# Patient Record
Sex: FEMALE | URBAN - METROPOLITAN AREA
[De-identification: names, ages, dates, MRNs, and addresses within clinical notes are randomized per-mention and may not be internally consistent; named-entity substitution may affect disease eponyms.]

---

## 2024-04-08 ENCOUNTER — OFFICE VISIT (OUTPATIENT)
Dept: INTERNAL MEDICINE CLINIC | Age: 29
End: 2024-04-08
Payer: COMMERCIAL

## 2024-04-08 VITALS
BODY MASS INDEX: 38.99 KG/M2 | HEIGHT: 65 IN | SYSTOLIC BLOOD PRESSURE: 130 MMHG | OXYGEN SATURATION: 98 % | RESPIRATION RATE: 17 BRPM | WEIGHT: 234 LBS | DIASTOLIC BLOOD PRESSURE: 88 MMHG | HEART RATE: 95 BPM | TEMPERATURE: 97.2 F

## 2024-04-08 DIAGNOSIS — F41.9 ANXIETY AND DEPRESSION: ICD-10-CM

## 2024-04-08 DIAGNOSIS — J45.20 MILD INTERMITTENT ASTHMA WITHOUT COMPLICATION: Primary | ICD-10-CM

## 2024-04-08 DIAGNOSIS — F32.A ANXIETY AND DEPRESSION: ICD-10-CM

## 2024-04-08 DIAGNOSIS — Z83.3 FAMILY HISTORY OF DIABETES MELLITUS: ICD-10-CM

## 2024-04-08 DIAGNOSIS — R73.9 HYPERGLYCEMIA: ICD-10-CM

## 2024-04-08 DIAGNOSIS — F50.81 BINGE EATING DISORDER: ICD-10-CM

## 2024-04-08 DIAGNOSIS — R63.8 INCREASED BMI: ICD-10-CM

## 2024-04-08 PROBLEM — F50.819 BINGE EATING DISORDER: Status: ACTIVE | Noted: 2024-04-08

## 2024-04-08 PROCEDURE — 99204 OFFICE O/P NEW MOD 45 MIN: CPT | Performed by: FAMILY MEDICINE

## 2024-04-08 RX ORDER — ALBUTEROL SULFATE 90 UG/1
2 AEROSOL, METERED RESPIRATORY (INHALATION) EVERY 6 HOURS PRN
COMMUNITY
End: 2024-04-08 | Stop reason: SDUPTHER

## 2024-04-08 RX ORDER — ALBUTEROL SULFATE 90 UG/1
2 AEROSOL, METERED RESPIRATORY (INHALATION) EVERY 6 HOURS PRN
Qty: 18 G | Refills: 1 | Status: SHIPPED | OUTPATIENT
Start: 2024-04-08

## 2024-04-08 RX ORDER — FLUOXETINE 10 MG/1
10 CAPSULE ORAL DAILY
Qty: 90 CAPSULE | Refills: 0 | Status: SHIPPED | OUTPATIENT
Start: 2024-04-08

## 2024-04-08 SDOH — ECONOMIC STABILITY: FOOD INSECURITY: WITHIN THE PAST 12 MONTHS, THE FOOD YOU BOUGHT JUST DIDN'T LAST AND YOU DIDN'T HAVE MONEY TO GET MORE.: NEVER TRUE

## 2024-04-08 SDOH — ECONOMIC STABILITY: INCOME INSECURITY: HOW HARD IS IT FOR YOU TO PAY FOR THE VERY BASICS LIKE FOOD, HOUSING, MEDICAL CARE, AND HEATING?: NOT HARD AT ALL

## 2024-04-08 SDOH — ECONOMIC STABILITY: HOUSING INSECURITY
IN THE LAST 12 MONTHS, WAS THERE A TIME WHEN YOU DID NOT HAVE A STEADY PLACE TO SLEEP OR SLEPT IN A SHELTER (INCLUDING NOW)?: NO

## 2024-04-08 SDOH — ECONOMIC STABILITY: FOOD INSECURITY: WITHIN THE PAST 12 MONTHS, YOU WORRIED THAT YOUR FOOD WOULD RUN OUT BEFORE YOU GOT MONEY TO BUY MORE.: NEVER TRUE

## 2024-04-08 ASSESSMENT — PATIENT HEALTH QUESTIONNAIRE - PHQ9
1. LITTLE INTEREST OR PLEASURE IN DOING THINGS: NOT AT ALL
2. FEELING DOWN, DEPRESSED OR HOPELESS: SEVERAL DAYS
SUM OF ALL RESPONSES TO PHQ9 QUESTIONS 1 & 2: 1
SUM OF ALL RESPONSES TO PHQ QUESTIONS 1-9: 1

## 2024-04-08 ASSESSMENT — ENCOUNTER SYMPTOMS
RESPIRATORY NEGATIVE: 1
GASTROINTESTINAL NEGATIVE: 1
EYES NEGATIVE: 1
ALLERGIC/IMMUNOLOGIC NEGATIVE: 1

## 2024-04-08 NOTE — PROGRESS NOTES
Hyperglycemia  CBC    Comprehensive Metabolic Panel    Hemoglobin A1C    Lipid Panel    TSH    HIV Rapid 1&2    T. pallidum Ab    Hepatitis Panel, Acute      5. Binge eating disorder        6. Anxiety and depression              Objective:   Physical Exam  Vitals and nursing note reviewed.   Constitutional:       Appearance: She is well-developed.      Comments: Morbid obesity   HENT:      Head: Normocephalic and atraumatic.      Right Ear: External ear normal.      Left Ear: External ear normal.   Eyes:      Conjunctiva/sclera: Conjunctivae normal.      Pupils: Pupils are equal, round, and reactive to light.   Cardiovascular:      Rate and Rhythm: Normal rate and regular rhythm.      Heart sounds: Normal heart sounds.   Pulmonary:      Effort: Pulmonary effort is normal.      Breath sounds: Normal breath sounds.   Abdominal:      General: Bowel sounds are normal.      Palpations: Abdomen is soft.   Genitourinary:     Vagina: Normal.   Musculoskeletal:         General: Normal range of motion.      Cervical back: Normal range of motion and neck supple.   Skin:     General: Skin is warm and dry.   Neurological:      Mental Status: She is alert and oriented to person, place, and time.      Deep Tendon Reflexes: Reflexes are normal and symmetric.   Psychiatric:      Comments: Anxiety/depression  Somatization  Binge eating disorder         Assessment:       Diagnosis Orders   1. Mild intermittent asthma without complication  CBC    Comprehensive Metabolic Panel    Hemoglobin A1C    Lipid Panel    TSH    HIV Rapid 1&2    T. pallidum Ab    Hepatitis Panel, Acute      2. Increased BMI  CBC    Comprehensive Metabolic Panel    Hemoglobin A1C    Lipid Panel    TSH    HIV Rapid 1&2    T. pallidum Ab    Hepatitis Panel, Acute      3. Family history of diabetes mellitus  CBC    Comprehensive Metabolic Panel    Hemoglobin A1C    Lipid Panel    TSH    HIV Rapid 1&2    T. pallidum Ab    Hepatitis Panel, Acute      4. Hyperglycemia

## 2024-06-24 ENCOUNTER — OFFICE VISIT (OUTPATIENT)
Dept: FAMILY MEDICINE CLINIC | Age: 29
End: 2024-06-24
Payer: COMMERCIAL

## 2024-06-24 VITALS
HEIGHT: 65 IN | SYSTOLIC BLOOD PRESSURE: 152 MMHG | HEART RATE: 93 BPM | WEIGHT: 233.8 LBS | BODY MASS INDEX: 38.95 KG/M2 | DIASTOLIC BLOOD PRESSURE: 102 MMHG

## 2024-06-24 DIAGNOSIS — I10 PRIMARY HYPERTENSION: Primary | ICD-10-CM

## 2024-06-24 PROCEDURE — 99203 OFFICE O/P NEW LOW 30 MIN: CPT

## 2024-06-24 PROCEDURE — 3077F SYST BP >= 140 MM HG: CPT

## 2024-06-24 PROCEDURE — 3080F DIAST BP >= 90 MM HG: CPT

## 2024-06-24 RX ORDER — AMLODIPINE BESYLATE 5 MG/1
5 TABLET ORAL DAILY
Qty: 30 TABLET | Refills: 3 | Status: SHIPPED | OUTPATIENT
Start: 2024-06-24

## 2024-06-24 ASSESSMENT — ANXIETY QUESTIONNAIRES
7. FEELING AFRAID AS IF SOMETHING AWFUL MIGHT HAPPEN: MORE THAN HALF THE DAYS
GAD7 TOTAL SCORE: 5
4. TROUBLE RELAXING: NOT AT ALL
6. BECOMING EASILY ANNOYED OR IRRITABLE: SEVERAL DAYS
5. BEING SO RESTLESS THAT IT IS HARD TO SIT STILL: NOT AT ALL
3. WORRYING TOO MUCH ABOUT DIFFERENT THINGS: SEVERAL DAYS
1. FEELING NERVOUS, ANXIOUS, OR ON EDGE: NOT AT ALL
2. NOT BEING ABLE TO STOP OR CONTROL WORRYING: SEVERAL DAYS
IF YOU CHECKED OFF ANY PROBLEMS ON THIS QUESTIONNAIRE, HOW DIFFICULT HAVE THESE PROBLEMS MADE IT FOR YOU TO DO YOUR WORK, TAKE CARE OF THINGS AT HOME, OR GET ALONG WITH OTHER PEOPLE: SOMEWHAT DIFFICULT

## 2024-06-24 NOTE — PROGRESS NOTES
Subjective:    Marbella Chao is a 28 y.o. female with  has a past medical history of Asthma.    Presented to the office today for:  Chief Complaint   Patient presents with    New Patient     New patient est primary care     Hypertension     Hx of HTN       HPI  Marbella is a 28-year-old female who is new to provider and establishing care.  She has a past medical history of mild asthma, depression, binge eating disorder.  She states she is not taking her fluoxetine that was prescribed by previous PCP and states she feels like her mental health has been getting better.  Her BP today is 152/102.  According to her, she has had elevated BP readings for few years now but was never put on any medications.  She has recently joined a fitness class and is trying to lose weight to help with lowering her blood pressure.  Her CASANDRA 7 score is 5 today.  Patient states her anxiety is getting better by being around better company and choosing not to worry about things are not under her control.    Review of Systems    REVIEW OF SYSTEMS    Constitutional:  Denies fever, chills, or weakness   Eyes:  Denies vision changes  HENT:  Denies sore throat, neck pain, or headache   Respiratory:  Denies cough or shortness of breath   Cardiovascular:  Denies chest pain or palpitations  GI:  Denies abdominal pain, nausea, vomiting, or diarrhea   Musculoskeletal:  Denies back pain or joint pain  Neurologic: Denies any numbness, weakness, or tingling  Psychiatric: Normal mood and affect       The patient has a No family history on file.    Objective:    BP (!) 152/102 (Site: Left Upper Arm, Position: Sitting, Cuff Size: Large Adult)   Pulse 93   Ht 1.651 m (5' 5\")   Wt 106.1 kg (233 lb 12.8 oz)   LMP 06/03/2024 (Exact Date)   BMI 38.91 kg/m²    BP Readings from Last 3 Encounters:   06/24/24 (!) 152/102   04/08/24 130/88       Physical Exam    Constitutional:       General: She is not in acute distress.     Appearance: Normal appearance.

## 2024-06-24 NOTE — PROGRESS NOTES
HYPERTENSION visit     BP Readings from Last 3 Encounters:   04/08/24 130/88       No results found for: \"HDL\", \"BUN\", \"CREATININE\", \"GLUCOSE\"           Have you changed or started any medications since your last visit including any over-the-counter medicines, vitamins, or herbal medicines? no   Have you stopped taking any of your medications? Is so, why? -  no  Are you having any side effects from any of your medications? - no  How often do you miss doses of your medication? rare      Have you seen any other physician or provider since your last visit?  no   Have you had any other diagnostic tests since your last visit?  no   Have you been seen in the emergency room and/or had an admission in a hospital since we last saw you?  no   Have you had your routine dental cleaning in the past 6 months?  no     Do you have an active MyChart account? If no, what is the barrier?  Yes    Patient Care Team:  Artie Rodrigues MD as PCP - General (Family Medicine)  Jorge Weiner MD as PCP - Empaneled Provider    Medical History Review  Past Medical, Family, and Social History reviewed and does contribute to the patient presenting condition    Health Maintenance   Topic Date Due    COVID-19 Vaccine (1) Never done    HIV screen  Never done    Hepatitis C screen  Never done    Pap smear  Never done    DTaP/Tdap/Td vaccine (7 - Td or Tdap) 08/20/2017    Flu vaccine (Season Ended) 08/01/2024    Depression Monitoring  04/08/2025    Hepatitis A vaccine  Completed    Hepatitis B vaccine  Completed    HPV vaccine  Completed    Polio vaccine  Completed    Varicella vaccine  Completed    Hib vaccine  Aged Out    Meningococcal (ACWY) vaccine  Aged Out    Pneumococcal 0-64 years Vaccine  Aged Out    Depression Screen  Discontinued

## 2024-06-24 NOTE — PROGRESS NOTES
Attending Physician Statement  I  have discussed the care of Marbella Chao including pertinent history and exam findings with the resident. I agree with the assessment, plan and orders as documented by the resident.      BP (!) 152/102 (Site: Left Upper Arm, Position: Sitting, Cuff Size: Large Adult)   Pulse 93   Ht 1.651 m (5' 5\")   Wt 106.1 kg (233 lb 12.8 oz)   LMP 06/03/2024 (Exact Date)   BMI 38.91 kg/m²    BP Readings from Last 3 Encounters:   06/24/24 (!) 152/102   04/08/24 130/88     Wt Readings from Last 3 Encounters:   06/24/24 106.1 kg (233 lb 12.8 oz)   04/08/24 106.1 kg (234 lb)          Diagnosis Orders   1. Primary hypertension  amLODIPine (NORVASC) 5 MG tablet              Jose L Kwon DO 6/24/2024 4:29 PM

## 2024-06-24 NOTE — PATIENT INSTRUCTIONS
Thank you for letting us take care of you today. We hope all your questions were addressed. If a question was overlooked or something else comes to mind after you return home, please contact a member of your Care Team listed below.      Your Care Team at [facility] is Team #1  Adrienne Weiner M.D. (Faculty)  Dudley Cormier M.D. (Resident)  Carroll Johnson D.O. (Resident)  Artie Rodrigues M.D. (Resident)  Orion Naranjo M.D. (Resident)  Venita Kearney, [facility]  Daniel Reid, [facility]  Malena Blanchard, [facility]  Jen Akers, [facility]  Balbina Austin, [facility]  Sandi Crenshaw, [facility]  Monse Ulrich, [facility]  Stephen (LJ) Tita,   Maryellen Cleary [facility] (Clinical Pharmacist)     Office phone number: [phone]    If you need to get in right away due to illness, please be advised we have \"Same Day\" appointments available Monday-Friday. Please call us at [phone] option #3 to schedule your \"Same Day\" appointment.

## 2024-07-09 ENCOUNTER — OFFICE VISIT (OUTPATIENT)
Dept: FAMILY MEDICINE CLINIC | Age: 29
End: 2024-07-09

## 2024-07-09 DIAGNOSIS — I10 PRIMARY HYPERTENSION: Primary | ICD-10-CM

## 2024-07-10 NOTE — PROGRESS NOTES
7/10/2024  Time: 3:50-4:30    Marbella Chao  was referred to Sheila Matos, PhD by [facility] physicians.    Clinician met with patient for educational group before being scheduled with clinician on individual schedule.  Patient alert, oriented, and able to participate in group.  Provided psycho-education about depression, anxiety, the stress response, sleep, exercise, and self-care.  Engaged in relaxation exercise.  Pt. did sign consent form after being provided with information about brief approach to therapy and confidentiality.    Patient did schedule individual appointment with provider following group for Tues 7- at 3:30.

## 2024-07-15 ENCOUNTER — OFFICE VISIT (OUTPATIENT)
Dept: FAMILY MEDICINE CLINIC | Age: 29
End: 2024-07-15
Payer: COMMERCIAL

## 2024-07-15 ENCOUNTER — HOSPITAL ENCOUNTER (OUTPATIENT)
Age: 29
Setting detail: SPECIMEN
Discharge: HOME | End: 2024-07-15

## 2024-07-15 VITALS
HEART RATE: 98 BPM | BODY MASS INDEX: 38.32 KG/M2 | SYSTOLIC BLOOD PRESSURE: 136 MMHG | HEIGHT: 65 IN | DIASTOLIC BLOOD PRESSURE: 89 MMHG | WEIGHT: 230 LBS

## 2024-07-15 DIAGNOSIS — Z83.3 FAMILY HISTORY OF DIABETES MELLITUS: ICD-10-CM

## 2024-07-15 DIAGNOSIS — R73.9 HYPERGLYCEMIA: ICD-10-CM

## 2024-07-15 DIAGNOSIS — J45.20 MILD INTERMITTENT ASTHMA WITHOUT COMPLICATION: ICD-10-CM

## 2024-07-15 DIAGNOSIS — Z01.419 PAP SMEAR, AS PART OF ROUTINE GYNECOLOGICAL EXAMINATION: ICD-10-CM

## 2024-07-15 DIAGNOSIS — Z01.419 PAP SMEAR, AS PART OF ROUTINE GYNECOLOGICAL EXAMINATION: Primary | ICD-10-CM

## 2024-07-15 DIAGNOSIS — R63.8 INCREASED BMI: ICD-10-CM

## 2024-07-15 LAB
ALBUMIN SERPL-MCNC: 4.5 G/DL (ref 3.5–5.2)
ALBUMIN/GLOB SERPL: 1 {RATIO} (ref 1–2.5)
ALP SERPL-CCNC: 65 U/L (ref 35–104)
ALT SERPL-CCNC: 14 U/L (ref 10–35)
ANION GAP SERPL CALCULATED.3IONS-SCNC: 14 MMOL/L (ref 9–16)
AST SERPL-CCNC: 20 U/L (ref 10–35)
BILIRUB SERPL-MCNC: 0.3 MG/DL (ref 0–1.2)
BUN SERPL-MCNC: 12 MG/DL (ref 6–20)
CALCIUM SERPL-MCNC: 9.5 MG/DL (ref 8.6–10.4)
CANDIDA SPECIES: NEGATIVE
CHLORIDE SERPL-SCNC: 102 MMOL/L (ref 98–107)
CO2 SERPL-SCNC: 22 MMOL/L (ref 20–31)
CREAT SERPL-MCNC: 1 MG/DL (ref 0.5–0.9)
ERYTHROCYTE [DISTWIDTH] IN BLOOD BY AUTOMATED COUNT: 12.8 % (ref 11.8–14.4)
EST. AVERAGE GLUCOSE BLD GHB EST-MCNC: 105 MG/DL
GARDNERELLA VAGINALIS: POSITIVE
GFR, ESTIMATED: 82 ML/MIN/1.73M2
GLUCOSE SERPL-MCNC: 77 MG/DL (ref 74–99)
HAV IGM SERPL QL IA: NONREACTIVE
HBA1C MFR BLD: 5.3 % (ref 4–6)
HBV CORE IGM SERPL QL IA: NONREACTIVE
HBV SURFACE AG SERPL QL IA: NONREACTIVE
HCT VFR BLD AUTO: 42.4 % (ref 36.3–47.1)
HCV AB SERPL QL IA: NONREACTIVE
HGB BLD-MCNC: 13.6 G/DL (ref 11.9–15.1)
HIV 1+2 AB+HIV1 P24 AG SERPL QL IA: NONREACTIVE
MCH RBC QN AUTO: 26.6 PG (ref 25.2–33.5)
MCHC RBC AUTO-ENTMCNC: 32.1 G/DL (ref 28.4–34.8)
MCV RBC AUTO: 83 FL (ref 82.6–102.9)
NRBC BLD-RTO: 0 PER 100 WBC
PLATELET # BLD AUTO: 309 K/UL (ref 138–453)
PMV BLD AUTO: 11.8 FL (ref 8.1–13.5)
POTASSIUM SERPL-SCNC: 3.8 MMOL/L (ref 3.7–5.3)
PROT SERPL-MCNC: 8.1 G/DL (ref 6.6–8.7)
RBC # BLD AUTO: 5.11 M/UL (ref 3.95–5.11)
SODIUM SERPL-SCNC: 138 MMOL/L (ref 136–145)
SOURCE: ABNORMAL
T PALLIDUM AB SER QL IA: NONREACTIVE
TRICHOMONAS: NEGATIVE
TSH SERPL DL<=0.05 MIU/L-ACNC: 2.43 UIU/ML (ref 0.27–4.2)
WBC OTHER # BLD: 8.7 K/UL (ref 3.5–11.3)

## 2024-07-15 PROCEDURE — 99213 OFFICE O/P EST LOW 20 MIN: CPT

## 2024-07-15 NOTE — PROGRESS NOTES
Subjective:    Marbella Chao is a 29 y.o. female with  has a past medical history of Asthma.    Presented to the office today for:  Chief Complaint   Patient presents with    Gynecologic Exam     Pap last one was 4 year ago      29-year-old female who is here for Pap smear    Pap smear   Indication: Routine screening  Previous abnormal test: Never  Currently sexually active: Yes   Last sexual intercourse within 6 months?: Yes   M/F/Both: Male   Contraception: None  Previous STDs: Unsure  GTPAL (, Term, , , Living): G2,T0, P0, A2, L0  REVIEW OF SYSTEMS    negative    normal menses, no abnormal bleeding, pelvic pain or discharge, no breast pain or new or enlarging lumps on self exam, no vaginal bleeding      The patient has a No family history on file.    Objective:    /89 (Site: Left Upper Arm, Position: Sitting, Cuff Size: Medium Adult)   Pulse 98   Ht 1.651 m (5' 5\")   Wt 104.3 kg (230 lb)   LMP 2024 (Approximate)   BMI 38.27 kg/m²    BP Readings from Last 3 Encounters:   07/15/24 136/89   24 (!) 152/102   24 130/88       Physical Exam    OBJECTIVE    EXTERNAL GENITALIA: normal appearing vulva with no masses, tenderness or lesions  VAGINA: white discharge present, no lesions  CERVIX: no lesions or cervical motion tenderness and white discharge present    GENITOURINARY EXAM   External Exam   Vulva: appropriate hair distribution, no lesions   Vagina: no atrophy noted, scant bloody discharge, no lesions, no evidence of  cystocele or rectocele   Urethra: no masses, tenderness, or scarring   Speculum/Internal Exam   Cervix: nontender, non-friable, nulliparous, no discharge    Assessment and Plan:    1. Pap smear, as part of routine gynecological examination  -Pap smear with HPV cotesting done today  -There was whitish discharge present in the vaginal canal along with surrounding cervix  -Patient states she only wants to be called if the testing comes back positive  -

## 2024-07-15 NOTE — PATIENT INSTRUCTIONS
Thank you for letting us take care of you today. We hope all your questions were addressed. If a question was overlooked or something else comes to mind after you return home, please contact a member of your Care Team listed below.      Your Care Team at [facility] is Team #1  Adrienne Weiner M.D. (Faculty)  Dudley Cormier M.D. (Resident)  Carroll Johnson D.O. (Resident)  Artie Rodrigues M.D. (Resident)  Orion Naranjo M.D. (Resident)  Venita Kearney, [facility]  Daniel Reid, [facility]  Malena Blanchard, [facility]  Jen Akers, [facility]  Balbina Austin, [facility]  Sandi Crenshaw, [facility]  Monse Ulrich, [facility]  Stephen (LJ) Tita,   Maryellen Cleary [facility] (Clinical Pharmacist)     Office phone number: [phone]    If you need to get in right away due to illness, please be advised we have \"Same Day\" appointments available Monday-Friday. Please call us at [phone] option #3 to schedule your \"Same Day\" appointment.

## 2024-07-15 NOTE — PROGRESS NOTES
Visit Information    Have you changed or started any medications since your last visit including any over-the-counter medicines, vitamins, or herbal medicines? no   Have you stopped taking any of your medications? Is so, why? -  no  Are you having any side effects from any of your medications? - no    Have you seen any other physician or provider since your last visit?  no   Have you had any other diagnostic tests since your last visit?  no   Have you been seen in the emergency room and/or had an admission in a hospital since we last saw you?  no   Have you had your routine dental cleaning in the past 6 months?  no     Do you have an active MyChart account? If no, what is the barrier?  Yes    Patient Care Team:  Artie Rodrigues MD as PCP - General (Family Medicine)    Medical History Review  Past Medical, Family, and Social History reviewed and does not contribute to the patient presenting condition    Health Maintenance   Topic Date Due    COVID-19 Vaccine (1) Never done    HIV screen  Never done    Hepatitis C screen  Never done    DTaP/Tdap/Td vaccine (7 - Td or Tdap) 08/20/2017    Cervical cancer screen  07/03/2024    Flu vaccine (1) 08/01/2024    Depression Monitoring  04/08/2025    Hepatitis A vaccine  Completed    Hepatitis B vaccine  Completed    HPV vaccine  Completed    Polio vaccine  Completed    Varicella vaccine  Completed    Hib vaccine  Aged Out    Meningococcal (ACWY) vaccine  Aged Out    Pneumococcal 0-64 years Vaccine  Aged Out    Depression Screen  Discontinued

## 2024-07-16 DIAGNOSIS — B96.89 BV (BACTERIAL VAGINOSIS): Primary | ICD-10-CM

## 2024-07-16 DIAGNOSIS — N76.0 BV (BACTERIAL VAGINOSIS): Primary | ICD-10-CM

## 2024-07-16 LAB
C TRACH DNA SPEC QL PROBE+SIG AMP: NEGATIVE
N GONORRHOEA DNA SPEC QL PROBE+SIG AMP: NEGATIVE
SPECIMEN DESCRIPTION: NORMAL

## 2024-07-16 RX ORDER — METRONIDAZOLE 500 MG/1
500 TABLET ORAL 2 TIMES DAILY
Qty: 14 TABLET | Refills: 0 | Status: SHIPPED | OUTPATIENT
Start: 2024-07-16 | End: 2024-07-23

## 2024-07-16 NOTE — PROGRESS NOTES
Writer attempted to call the patient regarding her positive Gardnerella result.  Prescription sent to pharmacy for Flagyl.  Patient did not answer her phone, voicemail was left by writer.    Electronically signed by Artie Rodrigues MD on 7/16/2024 at 3:32 PM

## 2024-07-16 NOTE — PROGRESS NOTES
Attending Physician Statement  I  have discussed the care of Marbella Chao including pertinent history and exam findings with the resident. I agree with the assessment, plan and orders as documented by the resident.      /89 (Site: Left Upper Arm, Position: Sitting, Cuff Size: Medium Adult)   Pulse 98   Ht 1.651 m (5' 5\")   Wt 104.3 kg (230 lb)   LMP 07/01/2024 (Approximate)   BMI 38.27 kg/m²    BP Readings from Last 3 Encounters:   07/15/24 136/89   06/24/24 (!) 152/102   04/08/24 130/88     Wt Readings from Last 3 Encounters:   07/15/24 104.3 kg (230 lb)   06/24/24 106.1 kg (233 lb 12.8 oz)   04/08/24 106.1 kg (234 lb)          Diagnosis Orders   1. Pap smear, as part of routine gynecological examination  PAP Smear    Vaginitis DNA Probe    Chlamydia/GC DNA, Thin Prep              José Bocanegra MD 7/16/2024 2:31 PM

## 2024-07-17 LAB
HPV I/H RISK 4 DNA CVX QL NAA+PROBE: NOT DETECTED
HPV SAMPLE: NORMAL
HPV, INTERPRETATION: NORMAL
HPV16 DNA CVX QL NAA+PROBE: NOT DETECTED
HPV18 DNA CVX QL NAA+PROBE: NOT DETECTED
SPECIMEN DESCRIPTION: NORMAL

## 2024-07-18 ENCOUNTER — HOSPITAL ENCOUNTER (OUTPATIENT)
Age: 29
Setting detail: SPECIMEN
Discharge: HOME | End: 2024-07-18

## 2024-07-18 DIAGNOSIS — Z83.3 FAMILY HISTORY OF DIABETES MELLITUS: ICD-10-CM

## 2024-07-18 DIAGNOSIS — R63.8 INCREASED BMI: ICD-10-CM

## 2024-07-18 DIAGNOSIS — J45.20 MILD INTERMITTENT ASTHMA WITHOUT COMPLICATION: ICD-10-CM

## 2024-07-18 DIAGNOSIS — R73.9 HYPERGLYCEMIA: ICD-10-CM

## 2024-07-18 LAB
CHOLEST SERPL-MCNC: 173 MG/DL (ref 0–199)
CHOLESTEROL/HDL RATIO: 4
HDLC SERPL-MCNC: 41 MG/DL
LDLC SERPL CALC-MCNC: 113 MG/DL (ref 0–100)
TRIGL SERPL-MCNC: 94 MG/DL
VLDLC SERPL CALC-MCNC: 19 MG/DL

## 2024-07-19 ENCOUNTER — TELEPHONE (OUTPATIENT)
Dept: FAMILY MEDICINE CLINIC | Age: 29
End: 2024-07-19

## 2024-07-19 NOTE — TELEPHONE ENCOUNTER
LVM for a return call to reschedule appointment from 07/22/2024 due to provider out of clinic that day.

## 2024-07-23 LAB — CYTOLOGY REPORT: NORMAL

## 2024-07-29 ENCOUNTER — OFFICE VISIT (OUTPATIENT)
Dept: FAMILY MEDICINE CLINIC | Age: 29
End: 2024-07-29
Payer: COMMERCIAL

## 2024-07-29 VITALS
HEIGHT: 66 IN | BODY MASS INDEX: 36.77 KG/M2 | OXYGEN SATURATION: 98 % | WEIGHT: 228.8 LBS | SYSTOLIC BLOOD PRESSURE: 130 MMHG | DIASTOLIC BLOOD PRESSURE: 79 MMHG | HEART RATE: 96 BPM

## 2024-07-29 DIAGNOSIS — I51.7 LVH (LEFT VENTRICULAR HYPERTROPHY): ICD-10-CM

## 2024-07-29 DIAGNOSIS — F32.A ANXIETY AND DEPRESSION: ICD-10-CM

## 2024-07-29 DIAGNOSIS — F41.9 ANXIETY AND DEPRESSION: ICD-10-CM

## 2024-07-29 DIAGNOSIS — E78.5 HYPERLIPIDEMIA, UNSPECIFIED HYPERLIPIDEMIA TYPE: ICD-10-CM

## 2024-07-29 DIAGNOSIS — N64.4 MASTALGIA: ICD-10-CM

## 2024-07-29 DIAGNOSIS — R06.02 SHORTNESS OF BREATH: ICD-10-CM

## 2024-07-29 DIAGNOSIS — I10 PRIMARY HYPERTENSION: Primary | ICD-10-CM

## 2024-07-29 PROCEDURE — 3075F SYST BP GE 130 - 139MM HG: CPT

## 2024-07-29 PROCEDURE — 99213 OFFICE O/P EST LOW 20 MIN: CPT

## 2024-07-29 PROCEDURE — 3078F DIAST BP <80 MM HG: CPT

## 2024-07-29 ASSESSMENT — ENCOUNTER SYMPTOMS
GASTROINTESTINAL NEGATIVE: 1
COUGH: 1
EYES NEGATIVE: 1

## 2024-07-29 NOTE — PROGRESS NOTES
HYPERTENSION visit     BP Readings from Last 3 Encounters:   07/15/24 136/89   06/24/24 (!) 152/102   04/08/24 130/88       HDL (mg/dL)   Date Value   07/18/2024 41     BUN (mg/dL)   Date Value   07/15/2024 12     Creatinine (mg/dL)   Date Value   07/15/2024 1.0 (H)     Glucose (mg/dL)   Date Value   07/15/2024 77              Have you changed or started any medications since your last visit including any over-the-counter medicines, vitamins, or herbal medicines? no   Have you stopped taking any of your medications? Is so, why? -  no  Are you having any side effects from any of your medications? - no  How often do you miss doses of your medication? no      Have you seen any other physician or provider since your last visit?  no   Have you had any other diagnostic tests since your last visit?  yes - labs   Have you been seen in the emergency room and/or had an admission in a hospital since we last saw you?  no   Have you had your routine dental cleaning in the past 6 months?  no     Do you have an active MyChart account? If no, what is the barrier?  No: pending    Patient Care Team:  Artie Rodrigues MD as PCP - General (Family Medicine)    Medical History Review  Past Medical, Family, and Social History reviewed and does not contribute to the patient presenting condition    Health Maintenance   Topic Date Due    COVID-19 Vaccine (1) Never done    Pneumococcal 0-64 years Vaccine (1 of 2 - PCV) Never done    DTaP/Tdap/Td vaccine (7 - Td or Tdap) 08/20/2017    Flu vaccine (1) 08/01/2024    Depression Monitoring  04/08/2025    Cervical cancer screen  07/15/2029    Hepatitis A vaccine  Completed    Hepatitis B vaccine  Completed    HPV vaccine  Completed    Polio vaccine  Completed    Varicella vaccine  Completed    Hepatitis C screen  Completed    HIV screen  Completed    Hib vaccine  Aged Out    Meningococcal (ACWY) vaccine  Aged Out    Depression Screen  Discontinued

## 2024-07-29 NOTE — PROGRESS NOTES
[facility] Program - Outpatient Note      Subjective:    Marbella Chao is a 29 y.o. female with  has a past medical history of Asthma.    Presented to the office today for:  Chief Complaint   Patient presents with    Hypertension     Follow up        HPI  Patient has a history of anxiety and depression.  She has comes today for follow-up of hypertension.  She takes amlodipine 5 mg and is compliant with medication.  She does not have amount of blood pressure monitor.  Blood pressure today was 130/79 mmHg.  Patient states that she eats out 2 times a week.  She states that she has started dancing as her form of physical activity.    Patient followed up on lab work.  On her lipid panel her LDL was slightly elevated at 113 mg/dL.  Her Pap smear was negative. Her vaginitis DNA probe showed positive for Gardnerella vaginalis.  Patient said she did not start the Flagyl as prescribed by her PCP.               Review of Systems   Constitutional:  Positive for appetite change (dreased appetite).   HENT:  Positive for postnasal drip.    Eyes: Negative.    Respiratory:  Positive for cough.    Cardiovascular: Negative.    Gastrointestinal: Negative.    Genitourinary: Negative.    Musculoskeletal: Negative.    Neurological: Negative.                  The patient has a No family history on file.    Objective:    /79 (Site: Left Upper Arm, Position: Sitting, Cuff Size: Large Adult)   Pulse 96   Ht 1.664 m (5' 5.5\")   Wt 103.8 kg (228 lb 12.8 oz)   LMP 07/01/2024 (Approximate)   SpO2 98%   BMI 37.50 kg/m²    BP Readings from Last 3 Encounters:   07/29/24 130/79   07/15/24 136/89   06/24/24 (!) 152/102       Physical Exam  Constitutional:       Appearance: Normal appearance.   Cardiovascular:      Rate and Rhythm: Normal rate and regular rhythm.   Pulmonary:      Effort: Pulmonary effort is normal.      Breath sounds: Normal breath sounds.   Abdominal:      Palpations: Abdomen

## 2024-07-30 NOTE — PROGRESS NOTES
Attending Physician Statement  I have discussed the case, including pertinent history and exam findings with the resident. I have seen and examined the patient and the key elements of the encounter have been performed by me.  I agree with the assessment, plan and orders as documented by the resident.        /79 (Site: Left Upper Arm, Position: Sitting, Cuff Size: Large Adult)   Pulse 96   Ht 1.664 m (5' 5.5\")   Wt 103.8 kg (228 lb 12.8 oz)   LMP 07/01/2024 (Approximate)   SpO2 98%   BMI 37.50 kg/m²    BP Readings from Last 3 Encounters:   07/29/24 130/79   07/15/24 136/89   06/24/24 (!) 152/102     Wt Readings from Last 3 Encounters:   07/29/24 103.8 kg (228 lb 12.8 oz)   07/15/24 104.3 kg (230 lb)   06/24/24 106.1 kg (233 lb 12.8 oz)        Diagnosis Orders   1. Primary hypertension        2. Hyperlipidemia, unspecified hyperlipidemia type  Lipid Panel      3. Anxiety and depression        4. LVH (left ventricular hypertrophy)  Echo (TTE) complete (PRN contrast/bubble/strain/3D)      5. Shortness of breath  Echo (TTE) complete (PRN contrast/bubble/strain/3D)      6. Joan Bravo DO, General Surgery, [facility]          Jose L Kwon DO 7/30/2024 8:31 AM

## 2024-08-07 ENCOUNTER — OFFICE VISIT (OUTPATIENT)
Dept: SURGERY | Age: 29
End: 2024-08-07
Payer: COMMERCIAL

## 2024-08-07 VITALS
BODY MASS INDEX: 36.74 KG/M2 | OXYGEN SATURATION: 99 % | DIASTOLIC BLOOD PRESSURE: 82 MMHG | WEIGHT: 228.6 LBS | TEMPERATURE: 98.5 F | HEIGHT: 66 IN | HEART RATE: 93 BPM | SYSTOLIC BLOOD PRESSURE: 132 MMHG

## 2024-08-07 DIAGNOSIS — N64.4 BREAST PAIN, RIGHT: Primary | ICD-10-CM

## 2024-08-07 PROCEDURE — 99203 OFFICE O/P NEW LOW 30 MIN: CPT

## 2024-08-07 NOTE — PROGRESS NOTES
Visit Information    Have you changed or started any medications since your last visit including any over-the-counter medicines, vitamins, or herbal medicines? no   Have you stopped taking any of your medications? Is so, why? -  no  Are you having any side effects from any of your medications? - no    Have you seen any other physician or provider since your last visit?  no   Have you had any other diagnostic tests since your last visit?  no   Have you been seen in the emergency room and/or had an admission in a hospital since we last saw you?  no   Have you had your routine dental cleaning in the past 6 months?  no     Do you have an active MyChart account? If no, what is the barrier?  No:     Patient Care Team:  Artie Rodrigues MD as PCP - General (Family Medicine)    Medical History Review  Past Medical, Family, and Social History reviewed and does not contribute to the patient presenting condition    Health Maintenance   Topic Date Due    COVID-19 Vaccine (1) Never done    Pneumococcal 0-64 years Vaccine (1 of 2 - PCV) Never done    DTaP/Tdap/Td vaccine (7 - Td or Tdap) 08/20/2017    Flu vaccine (1) 08/01/2024    Depression Monitoring  04/08/2025    Cervical cancer screen  07/15/2029    Hepatitis A vaccine  Completed    Hepatitis B vaccine  Completed    HPV vaccine  Completed    Polio vaccine  Completed    Varicella vaccine  Completed    Hepatitis C screen  Completed    HIV screen  Completed    Hib vaccine  Aged Out    Meningococcal (ACWY) vaccine  Aged Out    Depression Screen  Discontinued             
 (4/8/2024)    Overall Financial Resource Strain (CARDIA)     Difficulty of Paying Living Expenses: Not hard at all   Food Insecurity: No Food Insecurity (4/8/2024)    Hunger Vital Sign     Worried About Running Out of Food in the Last Year: Never true     Ran Out of Food in the Last Year: Never true   Transportation Needs: Unknown (4/8/2024)    PRAPARE - Transportation     Lack of Transportation (Medical): Not on file     Lack of Transportation (Non-Medical): No   Physical Activity: Not on file   Stress: Not on file   Social Connections: Not on file   Intimate Partner Violence: Not on file   Housing Stability: Unknown (4/8/2024)    Housing Stability Vital Sign     Unable to Pay for Housing in the Last Year: Not on file     Number of Places Lived in the Last Year: Not on file     Unstable Housing in the Last Year: No       ROS: History obtained from chart review and the patient  General ROS: negative  Breast ROS: positive for - nipple discharge and right breast pain  Respiratory ROS: no cough, shortness of breath, or wheezing  Cardiovascular ROS: no chest pain or dyspnea on exertion  Gastrointestinal ROS: no abdominal pain, change in bowel habits, or black or bloody stools  Musculoskeletal ROS: negative  Dermatological ROS: negative  11 systems reviewed. Negative other than those noted above.    Physical Exam:  Vitals:    08/07/24 1121   BP: 132/82   Pulse: 93   Temp: 98.5 °F (36.9 °C)   SpO2: 99%     General:A & O x3  HEENT:  NCAT, PERRL, EMOI, oral mucus membrane pink and moist, no mass palpated on neck exam  BREAST:Inspection negative. No nipple discharge or bleeding. No palpable mass, No skin changes or dimpling  Heart: S1S2, no mumurs, RRR  Lungs: clear to auscultation without wheezes or rales  Abdomen: soft, nontender, no HSM, no guarding, no rebound, no masses  RECTAL: deferred  Extremity: negative  SKIN: Skin color, texture, turgor normal. No rashes or lesions.  Neuro: CN II-XII grossly intact. No motor or 
moist/normal mouth and gums

## 2024-08-07 NOTE — PATIENT INSTRUCTIONS
Thank you letting us take care of you today. We hope that all your questions were addressed. If a question was overlooked or something else comes to mind after you return home, please call our office at [phone].    If you need to cancel or change an appointment, surgery or procedure, please contact the office at [phone].

## 2024-10-15 DIAGNOSIS — I10 PRIMARY HYPERTENSION: ICD-10-CM

## 2024-10-15 RX ORDER — AMLODIPINE BESYLATE 5 MG/1
5 TABLET ORAL DAILY
Qty: 30 TABLET | Refills: 5 | Status: SHIPPED | OUTPATIENT
Start: 2024-10-15

## 2024-10-15 NOTE — TELEPHONE ENCOUNTER
Please address the medication refill and close the encounter.  If I can be of assistance, please route to the applicable pool.     Patient state that she only has about a week worth of med's   Thank you.

## 2024-12-05 ENCOUNTER — TELEPHONE (OUTPATIENT)
Dept: FAMILY MEDICINE CLINIC | Age: 29
End: 2024-12-05

## 2024-12-05 NOTE — TELEPHONE ENCOUNTER
Patient called office in regards to getting her Echo scheduled. Writer provided scheduling number [phone]. Patient will call to schedule the Echo. Patient is due for an appointment.      Patient mychart not set up. Writer offered to assist with her mychart. Writer sent link to patient phone.Patient stated she will set up on her own.

## 2025-02-18 RX ORDER — ALBUTEROL SULFATE 90 UG/1
2 INHALANT RESPIRATORY (INHALATION) EVERY 6 HOURS PRN
Qty: 18 G | Refills: 5 | Status: SHIPPED | OUTPATIENT
Start: 2025-02-18

## 2025-02-18 NOTE — TELEPHONE ENCOUNTER
Last visit: 7/29/24  Last Med refill:   Does patient have enough medication for 72 hours: No:     Next Visit Date:  No future appointments.    Health Maintenance   Topic Date Due    Pneumococcal 0-49 years Vaccine (1 of 2 - PCV) Never done    DTaP/Tdap/Td vaccine (7 - Td or Tdap) 08/20/2017    Flu vaccine (1) 08/01/2024    COVID-19 Vaccine (1 - 2024-25 season) Never done    Depression Monitoring  04/08/2025    Cervical cancer screen  07/15/2029    Hepatitis A vaccine  Completed    Hepatitis B vaccine  Completed    HPV vaccine  Completed    Polio vaccine  Completed    Varicella vaccine  Completed    Hepatitis C screen  Completed    HIV screen  Completed    Hib vaccine  Aged Out    Meningococcal (ACWY) vaccine  Aged Out    Depression Screen  Discontinued       Hemoglobin A1C (%)   Date Value   07/15/2024 5.3             ( goal A1C is < 7)   No components found for: \"LABMICR\"  No components found for: \"LDLCHOLESTEROL\", \"LDLCALC\"    (goal LDL is <100)   AST (U/L)   Date Value   07/15/2024 20     ALT (U/L)   Date Value   07/15/2024 14     BUN (mg/dL)   Date Value   07/15/2024 12     BP Readings from Last 3 Encounters:   08/07/24 132/82   07/29/24 130/79   07/15/24 136/89          (goal 120/80)    All Future Testing planned in [facility]  Lab Frequency Next Occurrence   PAP Smear Once 07/15/2024   Lipid Panel Once 07/29/2024   Echo (TTE) complete (PRN contrast/bubble/strain/3D) Once 07/29/2024   JOYCE DIGITAL SCREENING AUGMENTED BILATERAL Once 08/07/2024               Patient Active Problem List:     Increased BMI     Hyperglycemia     Family history of diabetes mellitus     Binge eating disorder     Anxiety and depression

## 2025-04-14 DIAGNOSIS — I10 PRIMARY HYPERTENSION: ICD-10-CM

## 2025-04-14 RX ORDER — AMLODIPINE BESYLATE 5 MG/1
5 TABLET ORAL DAILY
Qty: 30 TABLET | Refills: 5 | Status: SHIPPED | OUTPATIENT
Start: 2025-04-14

## 2025-04-14 NOTE — TELEPHONE ENCOUNTER
Last visit: 7/29/25  Last Med refill: 10/15/24  Does patient have enough medication for 72 hours: no    Next Visit Date:  No future appointments.    Health Maintenance   Topic Date Due    Pneumococcal 0-49 years Vaccine (1 of 2 - PCV) Never done    DTaP/Tdap/Td vaccine (7 - Td or Tdap) 08/20/2017    COVID-19 Vaccine (1 - 2024-25 season) Never done    Depression Monitoring  04/08/2025    Flu vaccine (Season Ended) 08/01/2025    Cervical cancer screen  07/15/2029    Hepatitis A vaccine  Completed    Hepatitis B vaccine  Completed    HPV vaccine  Completed    Polio vaccine  Completed    Varicella vaccine  Completed    Hepatitis C screen  Completed    HIV screen  Completed    Hib vaccine  Aged Out    Meningococcal (ACWY) vaccine  Aged Out    Meningococcal B vaccine  Aged Out    Depression Screen  Discontinued       Hemoglobin A1C (%)   Date Value   07/15/2024 5.3             ( goal A1C is < 7)   No components found for: \"LABMICR\"  No components found for: \"LDLCHOLESTEROL\", \"LDLCALC\"    (goal LDL is <100)   AST (U/L)   Date Value   07/15/2024 20     ALT (U/L)   Date Value   07/15/2024 14     BUN (mg/dL)   Date Value   07/15/2024 12     BP Readings from Last 3 Encounters:   08/07/24 132/82   07/29/24 130/79   07/15/24 136/89          (goal 120/80)    All Future Testing planned in [facility]  Lab Frequency Next Occurrence   PAP Smear Once 07/15/2024   Lipid Panel Once 07/29/2024   Echo (TTE) complete (PRN contrast/bubble/strain/3D) Once 07/29/2024   JOYCE DIGITAL SCREENING AUGMENTED BILATERAL Once 08/07/2024               Patient Active Problem List:     Increased BMI     Hyperglycemia     Family history of diabetes mellitus     Binge eating disorder     Anxiety and depression

## 2025-04-21 DIAGNOSIS — I10 PRIMARY HYPERTENSION: ICD-10-CM

## 2025-04-22 RX ORDER — AMLODIPINE BESYLATE 5 MG/1
5 TABLET ORAL DAILY
Qty: 90 TABLET | OUTPATIENT
Start: 2025-04-22

## 2025-04-25 DIAGNOSIS — I10 PRIMARY HYPERTENSION: ICD-10-CM

## 2025-04-28 DIAGNOSIS — I10 PRIMARY HYPERTENSION: ICD-10-CM

## 2025-04-28 RX ORDER — AMLODIPINE BESYLATE 5 MG/1
5 TABLET ORAL DAILY
Qty: 90 TABLET | OUTPATIENT
Start: 2025-04-28

## 2025-04-28 NOTE — TELEPHONE ENCOUNTER
Patient is requesting her amlodipine 5 mg tab to be sent over to [facility] pharmacy in [place], [place] at [id number].      Last visit: 7-29-24  Last Med refill:   Does patient have enough medication for 72 hours: No:     Next Visit Date:  No future appointments.    Health Maintenance   Topic Date Due    Pneumococcal 0-49 years Vaccine (1 of 2 - PCV) Never done    DTaP/Tdap/Td vaccine (7 - Td or Tdap) 08/20/2017    COVID-19 Vaccine (1 - 2024-25 season) Never done    Depression Monitoring  04/08/2025    Flu vaccine (Season Ended) 08/01/2025    Cervical cancer screen  07/15/2029    Hepatitis A vaccine  Completed    Hepatitis B vaccine  Completed    HPV vaccine  Completed    Polio vaccine  Completed    Varicella vaccine  Completed    Hepatitis C screen  Completed    HIV screen  Completed    Hib vaccine  Aged Out    Meningococcal (ACWY) vaccine  Aged Out    Meningococcal B vaccine  Aged Out    Depression Screen  Discontinued       Hemoglobin A1C (%)   Date Value   07/15/2024 5.3             ( goal A1C is < 7)   No components found for: \"LABMICR\"  No components found for: \"LDLCHOLESTEROL\", \"LDLCALC\"    (goal LDL is <100)   AST (U/L)   Date Value   07/15/2024 20     ALT (U/L)   Date Value   07/15/2024 14     BUN (mg/dL)   Date Value   07/15/2024 12     BP Readings from Last 3 Encounters:   08/07/24 132/82   07/29/24 130/79   07/15/24 136/89          (goal 120/80)    All Future Testing planned in [facility]  Lab Frequency Next Occurrence   PAP Smear Once 07/15/2024   Lipid Panel Once 07/29/2024   Echo (TTE) complete (PRN contrast/bubble/strain/3D) Once 07/29/2024   JOYCE DIGITAL SCREENING AUGMENTED BILATERAL Once 08/07/2024               Patient Active Problem List:     Increased BMI     Hyperglycemia     Family history of diabetes mellitus     Binge eating disorder     Anxiety and depression

## 2025-04-29 RX ORDER — AMLODIPINE BESYLATE 5 MG/1
5 TABLET ORAL DAILY
Qty: 30 TABLET | Refills: 5 | Status: SHIPPED | OUTPATIENT
Start: 2025-04-29

## 2025-07-28 ENCOUNTER — HOSPITAL ENCOUNTER (OUTPATIENT)
Age: 30
Discharge: HOME | End: 2025-07-30
Payer: COMMERCIAL

## 2025-07-28 DIAGNOSIS — I51.7 LVH (LEFT VENTRICULAR HYPERTROPHY): ICD-10-CM

## 2025-07-28 DIAGNOSIS — R06.02 SHORTNESS OF BREATH: ICD-10-CM

## 2025-07-28 LAB
ECHO AO ROOT DIAM: 2.4 CM
ECHO AV AREA PEAK VELOCITY: 1.5 CM2
ECHO AV AREA VTI: 1.7 CM2
ECHO AV MEAN GRADIENT: 7 MMHG
ECHO AV MEAN VELOCITY: 1.2 M/S
ECHO AV PEAK GRADIENT: 14 MMHG
ECHO AV PEAK VELOCITY: 1.9 M/S
ECHO AV VELOCITY RATIO: 0.68
ECHO AV VTI: 35 CM
ECHO EST RA PRESSURE: 3 MMHG
ECHO LA AREA 2C: 15.8 CM2
ECHO LA AREA 4C: 16.8 CM2
ECHO LA DIAMETER: 2.5 CM
ECHO LA MAJOR AXIS: 5.6 CM
ECHO LA MINOR AXIS: 4.8 CM
ECHO LA TO AORTIC ROOT RATIO: 1.04
ECHO LA VOL BP: 43 ML (ref 22–52)
ECHO LA VOL MOD A2C: 43 ML (ref 22–52)
ECHO LA VOL MOD A4C: 38 ML (ref 22–52)
ECHO LV E' LATERAL VELOCITY: 13.8 CM/S
ECHO LV E' SEPTAL VELOCITY: 6.85 CM/S
ECHO LV EDV A2C: 69 ML
ECHO LV EDV A4C: 79 ML
ECHO LV EF PHYSICIAN: 65 %
ECHO LV EJECTION FRACTION A2C: 56 %
ECHO LV EJECTION FRACTION A4C: 67 %
ECHO LV EJECTION FRACTION BIPLANE: 62 % (ref 55–100)
ECHO LV ESV A2C: 30 ML
ECHO LV ESV A4C: 27 ML
ECHO LV FRACTIONAL SHORTENING: 35 % (ref 28–44)
ECHO LV INTERNAL DIMENSION DIASTOLIC: 3.4 CM (ref 3.9–5.3)
ECHO LV INTERNAL DIMENSION SYSTOLIC: 2.2 CM
ECHO LV IVSD: 1.2 CM (ref 0.6–0.9)
ECHO LV MASS 2D: 130.2 G (ref 67–162)
ECHO LV POSTERIOR WALL DIASTOLIC: 1.2 CM (ref 0.6–0.9)
ECHO LV RELATIVE WALL THICKNESS RATIO: 0.71
ECHO LVOT AREA: 2.3 CM2
ECHO LVOT AV VTI INDEX: 0.75
ECHO LVOT DIAM: 1.7 CM
ECHO LVOT MEAN GRADIENT: 3 MMHG
ECHO LVOT PEAK GRADIENT: 6 MMHG
ECHO LVOT PEAK VELOCITY: 1.3 M/S
ECHO LVOT SV: 59.4 ML
ECHO LVOT VTI: 26.2 CM
ECHO MV A VELOCITY: 0.65 M/S
ECHO MV AREA VTI: 2 CM2
ECHO MV E DECELERATION TIME (DT): 232 MS
ECHO MV E VELOCITY: 1.02 M/S
ECHO MV E/A RATIO: 1.57
ECHO MV E/E' LATERAL: 7.39
ECHO MV E/E' RATIO (AVERAGED): 11.14
ECHO MV E/E' SEPTAL: 14.89
ECHO MV LVOT VTI INDEX: 1.15
ECHO MV MAX VELOCITY: 1.2 M/S
ECHO MV MEAN GRADIENT: 2 MMHG
ECHO MV MEAN VELOCITY: 0.7 M/S
ECHO MV PEAK GRADIENT: 5 MMHG
ECHO MV VTI: 30 CM
ECHO PV MAX VELOCITY: 1.2 M/S
ECHO PV PEAK GRADIENT: 6 MMHG
ECHO RV FREE WALL PEAK S': 20.9 CM/S
ECHO RV INTERNAL DIMENSION: 4 CM
ECHO RV TAPSE: 2.8 CM (ref 1.7–?)

## 2025-07-28 PROCEDURE — 93306 TTE W/DOPPLER COMPLETE: CPT | Performed by: INTERNAL MEDICINE

## 2025-07-28 PROCEDURE — 93306 TTE W/DOPPLER COMPLETE: CPT

## 2025-08-26 ENCOUNTER — TELEPHONE (OUTPATIENT)
Age: 30
End: 2025-08-26